# Patient Record
(demographics unavailable — no encounter records)

---

## 2024-12-11 NOTE — ASSESSMENT
[FreeTextEntry1] : Patient is status post an upper endoscopy that revealed a hiatus hernia and patulous LES.  He is doing well on famotidine which she will continue.  He is scheduled for an abdominal sonogram next week.  Time spent in counseling and coordination of care 12 minutes.

## 2024-12-11 NOTE — HISTORY OF PRESENT ILLNESS
[Home] : at home, [unfilled] , at the time of the visit. [Medical Office: ([facility])___] : at the medical office located in  [Verbal consent obtained from patient] : the patient, [unfilled] [FreeTextEntry1] : The patient has had recurrent bouts of diverticulitis. He had a mild episode treated with antibiotics in November 2019 which subsided. He had a CAT scan done on 1/9/ 2020 which revealed a thickwalled diverticulum with  adjacent pericolic inflammatory reaction at the level of the mid descending colon consistent with diverticulitis. There was no abscess or extraluminal air. He was given a course of Cipro/Flagyl and is currently asymptomatic with normal bowel movements. .He took mesalamine 2 per day which he stopped about a year ago. He had a colonoscopy in 1/2017 that revealed sigmoid diverticulosis. His BM's have been good without further episodes of diverticulitis. Presents for screening colonoscopy. Some dyspeptic symptoms without dysphagia or early satiety. On Famotidine with relief.  Patient underwent a colonoscopy on 6/30/2022.  He had multiple diverticula in the sigmoid and the descending colon.  Patient also had a lipoma in the proximal ascending colon which was biopsied and biopsies were benign.  He is feeling well at this time.  8/20/2024-patient is a 73-year-old gentleman who was scheduled to undergo carpal tunnel surgery in several weeks.  For the past 2 to 3 weeks he has been complaining of increasing belching and gassiness associated with epigastric discomfort.  He took OTC omeprazole 20 mg with some improvement but not complete improvement in his symptoms. He does have some loose bowel movements but no further episodes of diverticulitis or pain.  He is taking Metamucil once a day.  He denies seeing any blood or mucus in the stool. He does claim to have had some increasing symptoms with lactose type foods. Patient was also recently told of being anemic.  He is seeing a hematologist.  9/11/2024-patient continues to have upper abdominal discomfort with belching and gassiness.  He could not tolerate omeprazole either 40 mg or 20 mg.  He does take famotidine 40 mg once a day.  This seems to help his symptoms but not completely relieve his symptoms. He did have an abdominal sonogram that revealed several gallbladder polyps.  The largest was 4 to 5 mm.  12/11/2024-Patient is status post an upper endoscopy on 11/14/2024.  He did have a small hiatus hernia with some irregularity at the GE junction.  He did have a patulous LES.  Pathology revealed gastric biopsies with mild chronic inflammation but H. pylori was negative.  Biopsies at the GE junction revealed chronic inflammation.  There was no intestinal metaplasia.  Biopsies of the esophagus revealed squamous mucosa with mild glycogenic acanthosis.  He is doing well on famotidine.